# Patient Record
Sex: FEMALE | Race: WHITE | URBAN - METROPOLITAN AREA
[De-identification: names, ages, dates, MRNs, and addresses within clinical notes are randomized per-mention and may not be internally consistent; named-entity substitution may affect disease eponyms.]

---

## 2018-12-28 ENCOUNTER — TELEPHONE (OUTPATIENT)
Dept: FAMILY PLANNING/WOMEN'S HEALTH CLINIC | Age: 53
End: 2018-12-28

## 2022-04-16 NOTE — TELEPHONE ENCOUNTER
Group Topic: BH Activity Group    Date: 4/16/2022  Start Time: 1700  End Time: 1800  Facilitators: Lillian Fan; Abilio Salas    Focus: increase mood, decrease anxiety  Number in attendance: 4    Method: Group   Attendance: Present  Participation: Active  Patient Response: Attentive  Mood: Happy  Affect: Type: Euthymic (normal mood)   Range: Full (normal)   Congruency: Congruent   Stability: Stable  Behavior/Socialization: Appropriate to group and Cooperative  Thought Process: Focused  Task Performance: Follows directions  Patient Evaluation: Independent - full participation     Patient actively participated in group.  Patient was engaged in the expressive therapy group.  Patient reported an increase in her mood.  Patient was encouraging towards peers.        Left message for pt to call [facility] to reschedule missed appt from 12/14/18.